# Patient Record
Sex: MALE | Race: BLACK OR AFRICAN AMERICAN | NOT HISPANIC OR LATINO | Employment: FULL TIME | URBAN - METROPOLITAN AREA
[De-identification: names, ages, dates, MRNs, and addresses within clinical notes are randomized per-mention and may not be internally consistent; named-entity substitution may affect disease eponyms.]

---

## 2022-08-19 ENCOUNTER — APPOINTMENT (OUTPATIENT)
Dept: ULTRASOUND IMAGING | Facility: CLINIC | Age: 67
End: 2022-08-19
Attending: PHYSICIAN ASSISTANT
Payer: MEDICARE

## 2022-08-19 ENCOUNTER — HOSPITAL ENCOUNTER (EMERGENCY)
Facility: CLINIC | Age: 67
Discharge: HOME | End: 2022-08-19
Attending: PHYSICIAN ASSISTANT | Admitting: PHYSICIAN ASSISTANT
Payer: MEDICARE

## 2022-08-19 ENCOUNTER — APPOINTMENT (OUTPATIENT)
Dept: GENERAL RADIOLOGY | Facility: CLINIC | Age: 67
End: 2022-08-19
Attending: PHYSICIAN ASSISTANT
Payer: MEDICARE

## 2022-08-19 VITALS
OXYGEN SATURATION: 98 % | TEMPERATURE: 98.4 F | SYSTOLIC BLOOD PRESSURE: 123 MMHG | RESPIRATION RATE: 20 BRPM | DIASTOLIC BLOOD PRESSURE: 75 MMHG | HEART RATE: 80 BPM

## 2022-08-19 DIAGNOSIS — M79.89 SWELLING OF ARM: ICD-10-CM

## 2022-08-19 PROCEDURE — 93971 EXTREMITY STUDY: CPT | Mod: RT

## 2022-08-19 PROCEDURE — 73080 X-RAY EXAM OF ELBOW: CPT | Mod: RT

## 2022-08-19 PROCEDURE — 99284 EMERGENCY DEPT VISIT MOD MDM: CPT | Mod: 25

## 2022-08-19 ASSESSMENT — ACTIVITIES OF DAILY LIVING (ADL): ADLS_ACUITY_SCORE: 35

## 2022-08-19 ASSESSMENT — ENCOUNTER SYMPTOMS: JOINT SWELLING: 1

## 2022-08-20 NOTE — ED TRIAGE NOTES
Pt notes swelling to RUE today. Pt denies paresthesia to RUE. Pt denies injury to limb. Pt denies chest pain or dyspnea. ABCs intact GCS 15     Triage Assessment     Row Name 08/19/22 2019       Triage Assessment (Adult)    Airway WDL WDL       Respiratory WDL    Respiratory WDL WDL       Skin Circulation/Temperature WDL    Skin Circulation/Temperature WDL WDL       Cardiac WDL    Cardiac WDL WDL       Peripheral/Neurovascular WDL    Peripheral Neurovascular WDL WDL       Cognitive/Neuro/Behavioral WDL    Cognitive/Neuro/Behavioral WDL WDL

## 2022-08-20 NOTE — ED PROVIDER NOTES
History     Chief Complaint:  Edema       HPI   Andrzej Rodriguez is a 67 year old male who presents to the ED for evaluation of right upper extremity swelling.  Patient notes onset of right upper extremity swelling that began around 1400 today.  He denies any falls or trauma to the region.  No history of blood clots.  No chest pain or shortness of breath.  He took Tylenol for his pain.  No fevers or chills.    ROS:  Review of Systems   Musculoskeletal: Positive for joint swelling.   All other systems reviewed and are negative.      Allergies:  No Known Allergies     Medications:    DITROPAN XL 5 MG 24 HR TAB   take 1 tablet (5mg) by oral route once daily   0 11/09/2006   Active   SIMVASTATIN 20 MG TAB   take 1 tablet (20mg) by oral route once daily in the evening        Plavix    Past Medical History:    Cardiac history    Past Surgical History:    Triple bypass     Social History:  Here with wife.    PCP: No primary care provider on file.     Physical Exam     Patient Vitals for the past 24 hrs:   BP Temp Temp src Pulse Resp SpO2   08/19/22 2019 123/75 98.4  F (36.9  C) Oral 80 20 98 %        Physical Exam  HEENT: mmm  Eyes: PERRL B/L  Neck: Supple  CV: Peripheral pulses intact and regular  Resp: Speaking in full sentences without any respiratory distress  Ext:  Right upper extremity  Swelling and TTP to dorsum of proximal radius/ulna.   Full ROM.  2+ radial pulse.  Motor:  Radial: Able to extend wrist.  Median: Able to make okay sign and touch pinky.  Ulnar: Able to spread fingers.  Sensory:  Radial: Can sense touch to dorsal webspace between thumb and index finger.  Median: Can sense touch to palmar surface of index finger.  Ulnar: Can sense touch to palmar aspect of little finger.  Sensation and perfusion intact throughout hand  Remainder of the skeletal survey is unremarkable  Skin: warm dry well perfused  Neuro: Alert  Nursing notes and vital signs reviewed.      Emergency Department Course      Imaging:  US Upper Extremity Venous Duplex Right   Final Result   IMPRESSION:   1.  No deep venous thrombosis in the right upper extremity.   2.  Nonspecific heterogeneous area in the area of swelling in the forearm demonstrating no vascularity and measuring up to 2.3 cm. This appearance is nonspecific and could reflect a hematoma or developing infection in the appropriate clinical setting.    Continued follow-up to resolution is recommended.      Elbow XR, G/E 3 views, right   Final Result   IMPRESSION: No visible fracture or dislocation.         Emergency Department Course:    Reviewed:  I reviewed nursing notes, vitals, past medical history and Care Everywhere    Assessments:   I obtained history and examined the patient as noted above.    I rechecked the patient and explained findings.     Disposition:  The patient was discharged to home.     Impression & Plan      Medical Decision Making:  Andrzej Rodriguez is a 67 year old male who presents to the ED for evaluation of of swelling to the right upper extremity.  This is atraumatic.  See HPI as above for additional details.  Vitals and physical exam as above.  Differential is broad and included fracture, strain, contusion, abscess, hematoma, DVT, cellulitis, amongst others.  X-ray obtained is negative for acute bony abnormality.  Ultrasound without evidence for DVT.  There was suggestion for likely hematoma on ultrasound however.  No signs or symptoms consistent with cellulitis/abscess at this time.  Discussed conservative cares.  Ace wrap provided.  Tylenol for pain.  Felt patient was safe for discharged home with close outpatient follow-up. Discussed reasons to return. All questions answered. Patient discharged to home in stable condition.    Diagnosis:    ICD-10-CM    1. Swelling of arm  M79.89         Discharge Medications:  New Prescriptions    No medications on file        8/19/2022   Jon Cadet PA-C     This record was created at least in part  using electronic voice recognition software, so please excuse any typographical errors.       Jon Cadet PA-C  08/19/22 0018

## 2022-08-20 NOTE — DISCHARGE INSTRUCTIONS
Imaging does not show any evidence for bone abnormality nor any abnormality to the vasculature. There is evidence for a fluid collection in area of muscle likely consistent with a bruise. We will treat this conservatively with Tylenol for pain as well as ACE wrap for compression and ice.